# Patient Record
Sex: FEMALE | Race: WHITE | NOT HISPANIC OR LATINO | URBAN - METROPOLITAN AREA
[De-identification: names, ages, dates, MRNs, and addresses within clinical notes are randomized per-mention and may not be internally consistent; named-entity substitution may affect disease eponyms.]

---

## 2023-08-30 ENCOUNTER — APPOINTMENT (OUTPATIENT)
Dept: PEDIATRICS | Facility: CLINIC | Age: 18
End: 2023-08-30
Payer: MEDICAID

## 2023-10-17 ENCOUNTER — TELEPHONE (OUTPATIENT)
Dept: PEDIATRICS | Facility: CLINIC | Age: 18
End: 2023-10-17
Payer: MEDICAID

## 2023-10-17 NOTE — TELEPHONE ENCOUNTER
Chen calling- has been lactating, she says more on one side then the other, it is a significant amount.  Per our discussion advised to call psychiatrist to discuss if this could be a side effect from one of the medications she is on.  If after talking to the psychiatrist she still needs to be seen she will call back.

## 2023-12-13 ENCOUNTER — TELEPHONE (OUTPATIENT)
Dept: PEDIATRICS | Facility: CLINIC | Age: 18
End: 2023-12-13
Payer: MEDICAID

## 2023-12-13 NOTE — TELEPHONE ENCOUNTER
Chen calling- left a tampon in for too long and thinks she has BV.  She said there is a very bad smell and she is having discharge, she is not able to come in until Tuesday because of work.  Wondering if you would prescribe something without seeing her.  I told her probably not but would ask you.  Please advise.     [phone]

## 2023-12-19 ENCOUNTER — TELEPHONE (OUTPATIENT)
Dept: PEDIATRICS | Facility: CLINIC | Age: 18
End: 2023-12-19
Payer: MEDICAID

## 2023-12-20 ENCOUNTER — OFFICE VISIT (OUTPATIENT)
Dept: PEDIATRICS | Facility: CLINIC | Age: 18
End: 2023-12-20
Payer: MEDICAID

## 2023-12-20 VITALS — TEMPERATURE: 98.2 F | WEIGHT: 168.56 LBS

## 2023-12-20 DIAGNOSIS — N76.0 BV (BACTERIAL VAGINOSIS): Primary | ICD-10-CM

## 2023-12-20 DIAGNOSIS — B96.89 BV (BACTERIAL VAGINOSIS): Primary | ICD-10-CM

## 2023-12-20 PROBLEM — E55.9 VITAMIN D DEFICIENCY: Status: ACTIVE | Noted: 2023-12-20

## 2023-12-20 PROBLEM — K21.9 GASTROESOPHAGEAL REFLUX: Status: ACTIVE | Noted: 2023-12-20

## 2023-12-20 PROBLEM — L20.9 ATOPIC DERMATITIS: Status: ACTIVE | Noted: 2023-12-20

## 2023-12-20 PROBLEM — F41.9 ANXIETY: Status: ACTIVE | Noted: 2023-12-20

## 2023-12-20 PROBLEM — Z90.5 SINGLE KIDNEY: Status: ACTIVE | Noted: 2023-12-20

## 2023-12-20 PROBLEM — F12.20: Status: ACTIVE | Noted: 2023-12-20

## 2023-12-20 PROBLEM — F32.A DEPRESSION: Status: ACTIVE | Noted: 2023-12-20

## 2023-12-20 PROBLEM — F31.9 BIPOLAR I DISORDER (MULTI): Status: ACTIVE | Noted: 2023-12-20

## 2023-12-20 PROBLEM — R45.851 SUICIDAL IDEATIONS: Status: RESOLVED | Noted: 2022-06-07 | Resolved: 2023-12-20

## 2023-12-20 PROBLEM — F23 ACUTE PSYCHOSIS (MULTI): Status: RESOLVED | Noted: 2022-07-04 | Resolved: 2023-12-20

## 2023-12-20 PROCEDURE — 87800 DETECT AGNT MULT DNA DIREC: CPT

## 2023-12-20 PROCEDURE — 1036F TOBACCO NON-USER: CPT | Performed by: PEDIATRICS

## 2023-12-20 PROCEDURE — 99214 OFFICE O/P EST MOD 30 MIN: CPT | Performed by: PEDIATRICS

## 2023-12-20 RX ORDER — METRONIDAZOLE 7.5 MG/G
GEL VAGINAL NIGHTLY
Qty: 70 G | Refills: 0 | Status: SHIPPED | OUTPATIENT
Start: 2023-12-20 | End: 2023-12-25

## 2023-12-20 RX ORDER — LURASIDONE HYDROCHLORIDE 80 MG/1
TABLET, FILM COATED ORAL
COMMUNITY
Start: 2023-12-05

## 2023-12-20 RX ORDER — HYDROXYZINE PAMOATE 25 MG/1
CAPSULE ORAL
COMMUNITY
Start: 2023-09-25

## 2023-12-20 RX ORDER — MELATONIN 10 MG
TABLET, SUBLINGUAL SUBLINGUAL
COMMUNITY
Start: 2023-08-22

## 2023-12-20 RX ORDER — FAMOTIDINE 20 MG/1
TABLET, FILM COATED ORAL
COMMUNITY
Start: 2023-05-10

## 2023-12-20 RX ORDER — ONDANSETRON 8 MG/1
TABLET, ORALLY DISINTEGRATING ORAL
COMMUNITY
Start: 2023-05-10

## 2023-12-20 RX ORDER — VENLAFAXINE 37.5 MG/1
37.5 TABLET ORAL DAILY
COMMUNITY
Start: 2023-10-30

## 2023-12-20 NOTE — PROGRESS NOTES
Chen Esqueda is a 18 y.o. female who presents for URI and possible BV     HPI    Chen has last been seen her for WVV in 5/2021.  She has had 2 interim visits for Depo injections also in 2021 and a visit for urinary frequency in 8/2022.  In the interim she has been diagnosed with bipolar disorder- she as been hospitalized a few times and has had multiple ER visit.  Today we reviewed her current medications.  She also currently has galactorrhea and has labs ordered by psych to workup.    Today she reports she generally doesn't feel well and has some mild cold sx;s and would like to not go to work.  She also reports she has a vaginal odor that started after placing in a tampon for some mild bleeding that was left in overnight and then sx's started.  She is not in a relationship but has been having unprotected sex.  Recent negative pregnancy test.  She is not on any contraception.  Denies dysuria or vaginal discharge      Objective   Temp 36.8 °C (98.2 °F)   Wt 76.5 kg (168 lb 9 oz)     Physical Exam  Constitutional:       Appearance: Normal appearance.   HENT:      Head: Normocephalic.      Right Ear: Tympanic membrane normal.      Left Ear: Tympanic membrane normal.      Mouth/Throat:      Mouth: Mucous membranes are moist.      Pharynx: No posterior oropharyngeal erythema.   Eyes:      Conjunctiva/sclera: Conjunctivae normal.   Cardiovascular:      Rate and Rhythm: Normal rate and regular rhythm.      Heart sounds: Normal heart sounds. No murmur heard.  Pulmonary:      Effort: Pulmonary effort is normal.      Breath sounds: Normal breath sounds. No wheezing.   Abdominal:      Palpations: Abdomen is soft.      Tenderness: There is no abdominal tenderness.   Lymphadenopathy:      Cervical: No cervical adenopathy.         Assessment/Plan       Her clinical presentation and examination indicates the diagnosis of possible mild URI and bacterial vaginosis.    We will screen for GC and CT due to unprotected intercourse.   Vaginal suppository cream provided for BV.    Supportive care measures and expected course of condition reviewed.  Note provided as work excuse for today.    Addressed acute sick visit issues today only.  Stressed importance of establishing a relationship with gyn, contraception and new PCP  Followup as needed no improvement.

## 2023-12-21 LAB
C TRACH RRNA SPEC QL NAA+PROBE: NEGATIVE
N GONORRHOEA DNA SPEC QL PROBE+SIG AMP: NEGATIVE

## 2024-02-27 ENCOUNTER — LAB (OUTPATIENT)
Dept: LAB | Facility: LAB | Age: 19
End: 2024-02-27
Payer: MEDICAID

## 2024-03-06 DIAGNOSIS — E55.9 VITAMIN D DEFICIENCY, UNSPECIFIED: ICD-10-CM

## 2024-03-06 DIAGNOSIS — Z79.899 OTHER LONG TERM (CURRENT) DRUG THERAPY: Primary | ICD-10-CM

## 2024-03-06 DIAGNOSIS — D51.9 VITAMIN B12 DEFICIENCY ANEMIA, UNSPECIFIED: ICD-10-CM

## 2024-03-12 ENCOUNTER — LAB (OUTPATIENT)
Dept: LAB | Facility: LAB | Age: 19
End: 2024-03-12
Payer: MEDICAID

## 2024-03-12 DIAGNOSIS — Z79.899 OTHER LONG TERM (CURRENT) DRUG THERAPY: ICD-10-CM

## 2024-03-12 DIAGNOSIS — E55.9 VITAMIN D DEFICIENCY, UNSPECIFIED: ICD-10-CM

## 2024-03-12 DIAGNOSIS — D51.9 VITAMIN B12 DEFICIENCY ANEMIA, UNSPECIFIED: ICD-10-CM

## 2024-03-12 LAB
25(OH)D3 SERPL-MCNC: 12 NG/ML (ref 30–100)
ALBUMIN SERPL BCP-MCNC: 4.5 G/DL (ref 3.4–5)
ALP SERPL-CCNC: 68 U/L (ref 33–110)
ALT SERPL W P-5'-P-CCNC: 25 U/L (ref 7–45)
ANION GAP SERPL CALC-SCNC: 14 MMOL/L (ref 10–20)
AST SERPL W P-5'-P-CCNC: 21 U/L (ref 9–39)
BASOPHILS # BLD AUTO: 0.01 X10*3/UL (ref 0–0.1)
BASOPHILS NFR BLD AUTO: 0.2 %
BILIRUB SERPL-MCNC: 0.4 MG/DL (ref 0–1.2)
BUN SERPL-MCNC: 7 MG/DL (ref 6–23)
CALCIUM SERPL-MCNC: 9.6 MG/DL (ref 8.6–10.6)
CHLORIDE SERPL-SCNC: 104 MMOL/L (ref 98–107)
CHOLEST SERPL-MCNC: 137 MG/DL (ref 0–199)
CHOLESTEROL/HDL RATIO: 2.5
CO2 SERPL-SCNC: 27 MMOL/L (ref 21–32)
CREAT SERPL-MCNC: 1.03 MG/DL (ref 0.5–1.05)
EGFRCR SERPLBLD CKD-EPI 2021: 80 ML/MIN/1.73M*2
EOSINOPHIL # BLD AUTO: 0.03 X10*3/UL (ref 0–0.7)
EOSINOPHIL NFR BLD AUTO: 0.6 %
ERYTHROCYTE [DISTWIDTH] IN BLOOD BY AUTOMATED COUNT: 15 % (ref 11.5–14.5)
EST. AVERAGE GLUCOSE BLD GHB EST-MCNC: 100 MG/DL
GLUCOSE SERPL-MCNC: 77 MG/DL (ref 74–99)
HBA1C MFR BLD: 5.1 %
HCT VFR BLD AUTO: 37.2 % (ref 36–46)
HDLC SERPL-MCNC: 54 MG/DL
HGB BLD-MCNC: 11.1 G/DL (ref 12–16)
IMM GRANULOCYTES # BLD AUTO: 0.01 X10*3/UL (ref 0–0.7)
IMM GRANULOCYTES NFR BLD AUTO: 0.2 % (ref 0–0.9)
LDLC SERPL CALC-MCNC: 69 MG/DL
LYMPHOCYTES # BLD AUTO: 1.29 X10*3/UL (ref 1.2–4.8)
LYMPHOCYTES NFR BLD AUTO: 27.2 %
MCH RBC QN AUTO: 27 PG (ref 26–34)
MCHC RBC AUTO-ENTMCNC: 29.8 G/DL (ref 32–36)
MCV RBC AUTO: 91 FL (ref 80–100)
MONOCYTES # BLD AUTO: 0.33 X10*3/UL (ref 0.1–1)
MONOCYTES NFR BLD AUTO: 6.9 %
NEUTROPHILS # BLD AUTO: 3.08 X10*3/UL (ref 1.2–7.7)
NEUTROPHILS NFR BLD AUTO: 64.9 %
NON HDL CHOLESTEROL: 83 MG/DL (ref 0–119)
NRBC BLD-RTO: 0 /100 WBCS (ref 0–0)
PLATELET # BLD AUTO: 448 X10*3/UL (ref 150–450)
POTASSIUM SERPL-SCNC: 4.5 MMOL/L (ref 3.5–5.3)
PROLACTIN SERPL-MCNC: 25.1 UG/L (ref 3–20)
PROT SERPL-MCNC: 7.2 G/DL (ref 6.4–8.2)
RBC # BLD AUTO: 4.11 X10*6/UL (ref 4–5.2)
SODIUM SERPL-SCNC: 140 MMOL/L (ref 136–145)
T3 SERPL-MCNC: 123 NG/DL (ref 80–210)
T4 FREE SERPL-MCNC: 0.93 NG/DL (ref 0.78–1.48)
TRIGL SERPL-MCNC: 69 MG/DL (ref 0–149)
TSH SERPL-ACNC: 1.3 MIU/L (ref 0.44–3.98)
VIT B12 SERPL-MCNC: 269 PG/ML (ref 211–911)
VLDL: 14 MG/DL (ref 0–40)
WBC # BLD AUTO: 4.8 X10*3/UL (ref 4.4–11.3)

## 2024-03-12 PROCEDURE — 83036 HEMOGLOBIN GLYCOSYLATED A1C: CPT

## 2024-03-12 PROCEDURE — 36415 COLL VENOUS BLD VENIPUNCTURE: CPT

## 2024-03-12 PROCEDURE — 84443 ASSAY THYROID STIM HORMONE: CPT

## 2024-03-12 PROCEDURE — 80053 COMPREHEN METABOLIC PANEL: CPT

## 2024-03-12 PROCEDURE — 80061 LIPID PANEL: CPT

## 2024-03-12 PROCEDURE — 82306 VITAMIN D 25 HYDROXY: CPT

## 2024-03-12 PROCEDURE — 84146 ASSAY OF PROLACTIN: CPT

## 2024-03-12 PROCEDURE — 84480 ASSAY TRIIODOTHYRONINE (T3): CPT

## 2024-03-12 PROCEDURE — 85025 COMPLETE CBC W/AUTO DIFF WBC: CPT

## 2024-03-12 PROCEDURE — 84439 ASSAY OF FREE THYROXINE: CPT

## 2024-03-12 PROCEDURE — 82607 VITAMIN B-12: CPT
